# Patient Record
Sex: MALE | Race: WHITE | NOT HISPANIC OR LATINO | Employment: FULL TIME | URBAN - METROPOLITAN AREA
[De-identification: names, ages, dates, MRNs, and addresses within clinical notes are randomized per-mention and may not be internally consistent; named-entity substitution may affect disease eponyms.]

---

## 2024-01-19 ENCOUNTER — HOSPITAL ENCOUNTER (OUTPATIENT)
Facility: HOSPITAL | Age: 24
Discharge: HOME | End: 2024-01-19
Attending: EMERGENCY MEDICINE | Admitting: EMERGENCY MEDICINE
Payer: OTHER MISCELLANEOUS

## 2024-01-19 ENCOUNTER — APPOINTMENT (OUTPATIENT)
Dept: GENERAL RADIOLOGY | Facility: HOSPITAL | Age: 24
End: 2024-01-19
Payer: OTHER MISCELLANEOUS

## 2024-01-19 VITALS
OXYGEN SATURATION: 97 % | WEIGHT: 195 LBS | DIASTOLIC BLOOD PRESSURE: 87 MMHG | HEART RATE: 84 BPM | RESPIRATION RATE: 16 BRPM | BODY MASS INDEX: 26.41 KG/M2 | HEIGHT: 72 IN | TEMPERATURE: 98.3 F | SYSTOLIC BLOOD PRESSURE: 143 MMHG

## 2024-01-19 DIAGNOSIS — S63.651A SPRAIN OF METACARPOPHALANGEAL (MCP) JOINT OF LEFT INDEX FINGER, INITIAL ENCOUNTER: Primary | ICD-10-CM

## 2024-01-19 PROCEDURE — G0463 HOSPITAL OUTPT CLINIC VISIT: HCPCS | Performed by: PHYSICIAN ASSISTANT

## 2024-01-19 PROCEDURE — 99202 OFFICE O/P NEW SF 15 MIN: CPT | Performed by: PHYSICIAN ASSISTANT

## 2024-01-19 PROCEDURE — 73140 X-RAY EXAM OF FINGER(S): CPT

## 2024-01-19 NOTE — Clinical Note
[facility]  [id number]  [facility] [address]    Frank Echevarria was seen and treated in our emergency department on 1/19/2024.  He may return to work on 01/20/2024.         Thank you for choosing [facility].    Bertha Lebron PA-C

## 2024-01-20 NOTE — DISCHARGE INSTRUCTIONS
Please ice your finger 2 to 3 times a day for the next few days.  Take over-the-counter ibuprofen for the pain.  Use the finger splint as needed for the pain.

## 2024-01-20 NOTE — FSED PROVIDER NOTE
Subjective   History of Present Illness    Patient reports that he was at work when he had a mechanical fall and tripped over a pallet, injuring his left index finger.  Patient is right-hand dominant.  Denies any previous injury to that finger.    Review of Systems   Musculoskeletal:  Positive for arthralgias.       History reviewed. No pertinent past medical history.    Allergies   Allergen Reactions    Aspirin Unknown - Low Severity    Codeine Unknown - Low Severity    Polymyxin B Unknown - Low Severity       Past Surgical History:   Procedure Laterality Date    EAR TUBES         History reviewed. No pertinent family history.    Social History     Socioeconomic History    Marital status: Single   Tobacco Use    Smoking status: Never           Objective   Physical Exam  Vitals and nursing note reviewed.   Constitutional:       Appearance: Normal appearance. He is normal weight.   HENT:      Head: Normocephalic and atraumatic.      Nose: Nose normal.      Mouth/Throat:      Mouth: Mucous membranes are moist.   Eyes:      Pupils: Pupils are equal, round, and reactive to light.   Cardiovascular:      Rate and Rhythm: Normal rate and regular rhythm.      Pulses: Normal pulses.      Heart sounds: Normal heart sounds.   Pulmonary:      Effort: Pulmonary effort is normal.      Breath sounds: Normal breath sounds.   Musculoskeletal:         General: Normal range of motion.      Cervical back: Normal range of motion.      Right lower leg: No edema.      Left lower leg: No edema.      Comments: Left index finger over the MCP joint: Mild tenderness palpation, able to flex and extend against resistance   Skin:     General: Skin is warm.   Neurological:      General: No focal deficit present.      Mental Status: He is alert.   Psychiatric:         Behavior: Behavior is cooperative.         Procedures           ED Course                                           Medical Decision Making  Problems Addressed:  Sprain of  metacarpophalangeal (MCP) joint of left index finger, initial encounter: complicated acute illness or injury    Amount and/or Complexity of Data Reviewed  Radiology: ordered.        Final diagnoses:   Sprain of metacarpophalangeal (MCP) joint of left index finger, initial encounter       ED Disposition  ED Disposition       ED Disposition   Discharge    Condition   Stable    Comment   --               Terence Harrington MD  [address] [id number]  [phone]               Medication List      No changes were made to your prescriptions during this visit.